# Patient Record
Sex: FEMALE | Race: OTHER | HISPANIC OR LATINO | Employment: PART TIME | URBAN - METROPOLITAN AREA
[De-identification: names, ages, dates, MRNs, and addresses within clinical notes are randomized per-mention and may not be internally consistent; named-entity substitution may affect disease eponyms.]

---

## 2019-10-18 RX ORDER — HYDROXYZINE PAMOATE 25 MG/1
25 CAPSULE ORAL 3 TIMES DAILY PRN
COMMUNITY
Start: 2019-06-26

## 2019-10-18 RX ORDER — FLUOXETINE HYDROCHLORIDE 40 MG/1
40 CAPSULE ORAL DAILY
COMMUNITY
Start: 2019-06-26

## 2019-10-18 RX ORDER — BUPROPION HYDROCHLORIDE 100 MG/1
100 TABLET, EXTENDED RELEASE ORAL
COMMUNITY
Start: 2019-09-23 | End: 2019-10-21

## 2019-10-18 RX ORDER — TOPIRAMATE 50 MG/1
50 TABLET, FILM COATED ORAL 3 TIMES DAILY
COMMUNITY
Start: 2019-06-26

## 2019-10-18 RX ORDER — QUETIAPINE FUMARATE 100 MG/1
TABLET, FILM COATED ORAL
COMMUNITY
Start: 2019-10-15

## 2019-10-18 RX ORDER — QUETIAPINE FUMARATE 50 MG/1
TABLET, FILM COATED ORAL
COMMUNITY
Start: 2019-10-15

## 2019-10-18 RX ORDER — BUPROPION HYDROCHLORIDE 150 MG/1
150 TABLET, EXTENDED RELEASE ORAL
COMMUNITY
Start: 2019-09-23 | End: 2019-10-21

## 2019-10-21 ENCOUNTER — OFFICE VISIT (OUTPATIENT)
Dept: FAMILY MEDICINE CLINIC | Facility: CLINIC | Age: 29
End: 2019-10-21
Payer: COMMERCIAL

## 2019-10-21 VITALS
SYSTOLIC BLOOD PRESSURE: 100 MMHG | HEART RATE: 110 BPM | DIASTOLIC BLOOD PRESSURE: 76 MMHG | HEIGHT: 62 IN | WEIGHT: 130.8 LBS | BODY MASS INDEX: 24.07 KG/M2 | TEMPERATURE: 98.5 F | RESPIRATION RATE: 16 BRPM | OXYGEN SATURATION: 97 %

## 2019-10-21 DIAGNOSIS — F31.9 BIPOLAR 1 DISORDER (HCC): ICD-10-CM

## 2019-10-21 DIAGNOSIS — Z00.00 HEALTHCARE MAINTENANCE: Primary | ICD-10-CM

## 2019-10-21 PROBLEM — F33.1 MODERATE EPISODE OF RECURRENT MAJOR DEPRESSIVE DISORDER (HCC): Status: ACTIVE | Noted: 2018-05-31

## 2019-10-21 PROBLEM — F15.11 METHAMPHETAMINE ABUSE IN REMISSION (HCC): Status: ACTIVE | Noted: 2019-06-26

## 2019-10-21 PROBLEM — B19.20 HEPATITIS C INFECTION: Status: ACTIVE | Noted: 2018-06-25

## 2019-10-21 PROBLEM — Z79.899 CONTROLLED SUBSTANCE AGREEMENT SIGNED: Status: ACTIVE | Noted: 2019-07-24

## 2019-10-21 PROCEDURE — 99385 PREV VISIT NEW AGE 18-39: CPT | Performed by: FAMILY MEDICINE

## 2019-10-21 RX ORDER — GABAPENTIN 100 MG/1
100 CAPSULE ORAL 3 TIMES DAILY
Qty: 90 CAPSULE | Refills: 0 | Status: SHIPPED | OUTPATIENT
Start: 2019-10-21 | End: 2019-11-18 | Stop reason: SDUPTHER

## 2019-10-21 NOTE — PROGRESS NOTES
Assessment/Plan:  Healthcare maintenance  It was discussed about immunization, diet, exercise and safety measures  Bipolar 1 disorder ([address] )  Fair controlled  Was given refills for her medications  I am going to add Neurontin 100 mg 3 times daily  It was discussed about possible side effect of the medication  I will increase the dose to 300 mg 3 times daily next visit  She is to come back in 1 month  Diagnoses and all orders for this visit:    Healthcare maintenance    Bipolar 1 disorder (HCC)  -     gabapentin (NEURONTIN) 100 mg capsule; Take 1 capsule (100 mg total) by mouth 3 (three) times a day    Other orders  -     Discontinue: buPROPion (WELLBUTRIN SR) 100 mg 12 hr tablet; Take 100 mg by mouth  -     Discontinue: buPROPion (WELLBUTRIN SR) 150 mg 12 hr tablet; Take 150 mg by mouth  -     FLUoxetine (PROzac) 40 MG capsule; Take 40 mg by mouth daily  -     hydrOXYzine pamoate (VISTARIL) 25 mg capsule; Take 25 mg by mouth Three times daily as needed  -     QUEtiapine (SEROquel) 100 mg tablet; TAKE 2 TABLETS DAILY IN THE EVENING  -     QUEtiapine (SEROquel) 50 mg tablet; TAKE 1 TABLET DAILY IN THE MORNING  -     topiramate (TOPAMAX) 50 MG tablet; Take 50 mg by mouth Three times a day          There are no Patient Instructions on file for this visit  Return in about 1 month (around 11/21/2019)  Subjective:      Patient ID: Fina Carias is a 34 y o  female  Chief Complaint   Patient presents with   [address]       She is here today to establish as a new patient and for wellness exam   She has history of bipolar disorder type 1 hand ADHD  She stated that Wellbutrin did not help her symptoms  She has been taking Seroquel, Topamax and Prozac  She denies any side effects from the medications  She continue to feel anxious  She wants to get back on Adderall  She has history of and phentermine abuse  Her previous doctor was considering starting her on Adderall        The following portions of the patient's history were reviewed and updated as appropriate: allergies, current medications, past family history, past medical history, past social history, past surgical history and problem list     Review of Systems   Constitutional: Negative for chills and fever  HENT: Negative for trouble swallowing  Eyes: Negative for visual disturbance  Respiratory: Negative for cough and shortness of breath  Cardiovascular: Negative for chest pain, palpitations and leg swelling  Gastrointestinal: Negative for abdominal pain, constipation and diarrhea  Endocrine: Negative for cold intolerance and heat intolerance  Genitourinary: Negative for difficulty urinating and dysuria  Musculoskeletal: Negative for gait problem  Skin: Negative for rash  Neurological: Negative for dizziness, tremors, seizures and headaches  Hematological: Negative for adenopathy  Psychiatric/Behavioral: Positive for decreased concentration  Negative for behavioral problems  The patient is nervous/anxious  Current Outpatient Medications   Medication Sig Dispense Refill    FLUoxetine (PROzac) 40 MG capsule Take 40 mg by mouth daily      hydrOXYzine pamoate (VISTARIL) 25 mg capsule Take 25 mg by mouth Three times daily as needed      QUEtiapine (SEROquel) 100 mg tablet TAKE 2 TABLETS DAILY IN THE EVENING      QUEtiapine (SEROquel) 50 mg tablet TAKE 1 TABLET DAILY IN THE MORNING      topiramate (TOPAMAX) 50 MG tablet Take 50 mg by mouth Three times a day      gabapentin (NEURONTIN) 100 mg capsule Take 1 capsule (100 mg total) by mouth 3 (three) times a day 90 capsule 0     No current facility-administered medications for this visit          Objective:    /76 (BP Location: Left arm, Patient Position: Sitting, Cuff Size: Standard)   Pulse (!) 110   Temp 98 5 °F (36 9 °C) (Tympanic)   Resp 16   Ht 5' 2" (1 575 m)   Wt 59 3 kg (130 lb 12 8 oz)   SpO2 97%   BMI 23 92 kg/m²        Physical Exam Constitutional: She is oriented to person, place, and time  She appears well-developed and well-nourished  HENT:   Head: Normocephalic and atraumatic  Eyes: Pupils are equal, round, and reactive to light  EOM are normal    Neck: Normal range of motion  Neck supple  Cardiovascular: Normal rate, regular rhythm and normal heart sounds  Pulmonary/Chest: Effort normal and breath sounds normal    Abdominal: Soft  Bowel sounds are normal    Musculoskeletal: Normal range of motion  She exhibits no edema  Lymphadenopathy:     She has no cervical adenopathy  Neurological: She is alert and oriented to person, place, and time  No cranial nerve deficit  Skin: Skin is warm  Psychiatric: She has a normal mood and affect  Nursing note and vitals reviewed  Depression Screening Follow-up Plan: Patient's depression screening was positive with a PHQ-2 score of 6  Their PHQ-9 score was 21  Patient assessed for underlying major depression  They have no active suicidal ideations  Brief counseling provided and recommend additional follow-up/re-evaluation next office visit         Mili Hogan MD

## 2019-10-21 NOTE — ASSESSMENT & PLAN NOTE
Fina alfonso  Was given refills for her medications  I am going to add Neurontin 100 mg 3 times daily  It was discussed about possible side effect of the medication  I will increase the dose to 300 mg 3 times daily next visit  She is to come back in 1 month

## 2019-11-18 DIAGNOSIS — F31.9 BIPOLAR 1 DISORDER (HCC): ICD-10-CM

## 2019-11-18 RX ORDER — GABAPENTIN 100 MG/1
CAPSULE ORAL
Qty: 90 CAPSULE | Refills: 0 | Status: SHIPPED | OUTPATIENT
Start: 2019-11-18

## 2020-03-04 ENCOUNTER — TELEPHONE (OUTPATIENT)
Dept: FAMILY MEDICINE CLINIC | Facility: CLINIC | Age: 30
End: 2020-03-04

## 2020-03-04 NOTE — TELEPHONE ENCOUNTER
Patient transferred to [address] [place] [address] Road   [address] [address] [phone]  Breana Moore

## 2020-03-16 NOTE — TELEPHONE ENCOUNTER
03/15/20 8:19 PM     Thank you for your request  Your request has been received, reviewed, and the patient chart updated  The PCP has successfully been removed with a patient attribution note  This message will now be completed      Thank you  Gweneth Ganser

## 2022-10-24 ENCOUNTER — TELEPHONE (OUTPATIENT)
Dept: PSYCHIATRY | Facility: CLINIC | Age: 32
End: 2022-10-24

## 2022-10-24 NOTE — TELEPHONE ENCOUNTER
Patient was added to the non referral wait list for talk therapy  Prefers a female provider in the [facility] location and in person

## 2023-10-16 ENCOUNTER — TELEPHONE (OUTPATIENT)
Dept: PSYCHIATRY | Facility: CLINIC | Age: 33
End: 2023-10-16

## 2023-10-16 NOTE — LETTER
Dear Shantal Gonzalez : We are contacting you because your name is currently included on the [address] [place] [address] wait-list for Talk Therapy and/or Medication Management. (Please Huslia which services are needed)     In our efforts to provide the highest quality care, [facility] has begun the process of upgrading our behavioral health systems to increase efficiency and expedite delivery of services. As part of this process, we ask you to please confirm your continued interest in the services above. If you are no longer interested or in need, please shu “No” in the area below. If you are still interested and in need, please shu “Yes” and provide your most current demographic and insurance information within 15 days. If we do not receive confirmation from you by 2023 your information will not be included in the system upgrade and your place on the waitlist will be lost.     Thank you in advance for your patience and understanding and we apologize for any inconvenience this may cause. Patient Name and :    Still in need of services: Yes or No     Current Address:     Phone#:     Best time to receive a call: Insurance Carrier:      Policy/ID#: Group#: Insurance Services Phone#:      What is your current presenting problem? Open to virtual talk therapy: Yes or No      We will call you to do an Intake when an appointment becomes available. You can send this information back to us in any of the ways below:    Email: [email or web address] Johnson  Fax#:  [phone]  Mail:   [facility]             [address] [facility], [address] [place] [address]